# Patient Record
Sex: MALE | Race: BLACK OR AFRICAN AMERICAN | NOT HISPANIC OR LATINO | URBAN - METROPOLITAN AREA
[De-identification: names, ages, dates, MRNs, and addresses within clinical notes are randomized per-mention and may not be internally consistent; named-entity substitution may affect disease eponyms.]

---

## 2017-08-25 ENCOUNTER — INPATIENT (INPATIENT)
Facility: HOSPITAL | Age: 32
LOS: 0 days | Discharge: HOME | DRG: 394 | End: 2017-08-26
Attending: INTERNAL MEDICINE | Admitting: INTERNAL MEDICINE
Payer: MEDICAID

## 2017-08-25 VITALS
SYSTOLIC BLOOD PRESSURE: 119 MMHG | TEMPERATURE: 98 F | RESPIRATION RATE: 18 BRPM | HEART RATE: 90 BPM | OXYGEN SATURATION: 100 % | DIASTOLIC BLOOD PRESSURE: 76 MMHG

## 2017-08-25 PROCEDURE — 99285 EMERGENCY DEPT VISIT HI MDM: CPT | Mod: 25

## 2017-08-25 PROCEDURE — 71020: CPT | Mod: 26

## 2017-08-25 PROCEDURE — 93010 ELECTROCARDIOGRAM REPORT: CPT

## 2017-08-25 RX ORDER — SODIUM CHLORIDE 9 MG/ML
1000 INJECTION INTRAMUSCULAR; INTRAVENOUS; SUBCUTANEOUS
Qty: 0 | Refills: 0 | Status: DISCONTINUED | OUTPATIENT
Start: 2017-08-25 | End: 2017-08-26

## 2017-08-25 RX ORDER — GLUCAGON INJECTION, SOLUTION 0.5 MG/.1ML
1 INJECTION, SOLUTION SUBCUTANEOUS ONCE
Qty: 0 | Refills: 0 | Status: COMPLETED | OUTPATIENT
Start: 2017-08-25 | End: 2017-08-25

## 2017-08-25 RX ADMIN — Medication 1 MILLIGRAM(S): at 23:24

## 2017-08-25 RX ADMIN — GLUCAGON INJECTION, SOLUTION 1 MILLIGRAM(S): 0.5 INJECTION, SOLUTION SUBCUTANEOUS at 23:03

## 2017-08-25 RX ADMIN — SODIUM CHLORIDE 125 MILLILITER(S): 9 INJECTION INTRAMUSCULAR; INTRAVENOUS; SUBCUTANEOUS at 23:03

## 2017-08-25 NOTE — ED PROVIDER NOTE - ENMT, MLM
Airway patent, Nasal mucosa clear. Mouth with normal mucosa. Throat has no vesicles, no oropharyngeal exudates and uvula is midline. No pooling of secretions. No drooling. No stridor. No dysphonia. No FB visualized. Sip of water given to pt results in pt looking in discomfort when attempting to swallow. Pt able to swallow sip of water after approx 1 min with pain. No regurgitation or vomiting

## 2017-08-25 NOTE — H&P ADULT - PROBLEM SELECTOR PLAN 3
FENA: FENA: NS @ 80 cc/hr; replete electrolytes as needed;     Prophylaxis: IMPROVE score is zero- no need for VTE prophylaxis    DISPO: ADMIT TO F     FULL CODE

## 2017-08-25 NOTE — ED PROVIDER NOTE - PROGRESS NOTE DETAILS
D/w GI Fellow Epifanio martinez d/w GI attending - if pt is not pooling secretions and pt did tolerate some PO earlier, and ingestion 10 hours ago, unlikely food bolus requiring emergent esophogram / EGD. Trial of Glucagon / benzos. Keep NPO. Admit to medicine. They will see the pt in the morning. Pt is resting comfortably, protecting airway

## 2017-08-25 NOTE — H&P ADULT - NSHPPHYSICALEXAM_GEN_ALL_CORE
.  VITAL SIGNS:  T(C): 37 (08-25-17 @ 23:36), Max: 37 (08-25-17 @ 23:36)  T(F): 98.6 (08-25-17 @ 23:36), Max: 98.6 (08-25-17 @ 23:36)  HR: 70 (08-25-17 @ 23:36) (70 - 90)  BP: 104/63 (08-25-17 @ 23:36) (103/57 - 119/76)  BP(mean): --  RR: 16 (08-25-17 @ 23:36) (16 - 18)  SpO2: 99% (08-25-17 @ 23:36) (99% - 100%)  Wt(kg): --    PHYSICAL EXAM:    Constitutional: WDWN resting comfortably in bed; looks to be anxious and in distress   Head: NC/AT  Eyes: PERRL, EOMI, clear conjunctiva  ENT: no nasal discharge; uvula midline, no oropharyngeal erythema or exudates; MMM  Neck: supple; no JVD or thyromegaly  Respiratory: CTA B/L; no W/R/R, no retractions  Cardiac: +S1/S2; RRR; no M/R/G; PMI non-displaced  Gastrointestinal: soft, NT/ND; no rebound or guarding; +BSx4  Genitourinary: normal external genitalia  Back: spine midline, no bony tenderness or step-offs; no CVAT B/L  Extremities: WWP, no clubbing or cyanosis; no peripheral edema  Musculoskeletal: NROM x4; no joint swelling, tenderness or erythema  Vascular: 2+ radial, femoral, DP/PT pulses B/L  Dermatologic: skin warm, dry and intact; no rashes, wounds, or scars  Lymphatic: no submandibular or cervical LAD  Neurologic: AAOx3; CNII-XII grossly intact; no focal deficits  Psychiatric: affect and characteristics of appearance, verbalizations, behaviors are appropriate .  VITAL SIGNS:  T(C): 37 (08-25-17 @ 23:36), Max: 37 (08-25-17 @ 23:36)  T(F): 98.6 (08-25-17 @ 23:36), Max: 98.6 (08-25-17 @ 23:36)  HR: 70 (08-25-17 @ 23:36) (70 - 90)  BP: 104/63 (08-25-17 @ 23:36) (103/57 - 119/76)  BP(mean): --  RR: 16 (08-25-17 @ 23:36) (16 - 18)  SpO2: 99% (08-25-17 @ 23:36) (99% - 100%)  Wt(kg): --    PHYSICAL EXAM:    Constitutional: WDWN resting comfortably in bed; looks to be anxious and in distress   Head: NC/AT  Eyes: PERRL, EOMI, clear conjunctiva  ENT: no nasal discharge; uvula midline, no oropharyngeal erythema or exudates; MMM  Neck: supple; no JVD or thyromegaly  Respiratory: CTA B/L; no W/R/R, no retractions  Cardiac: +S1/S2; RRR; no M/R/G;  Gastrointestinal: soft, NT/ND; no rebound or guarding; +BSx4  Extremities: WWP, no clubbing or cyanosis; no peripheral edema  Musculoskeletal: NROM x4; no joint swelling, tenderness or erythema  Vascular: 2+ radial pulses B/L  Dermatologic: skin warm, dry and intact; no rashes, wounds, or scars  Lymphatic: no submandibular or cervical LAD  Neurologic: AAOx3; CNII-XII grossly intact; no focal deficits  Psychiatric: affect and characteristics of appearance, verbalizations, behaviors are appropriate

## 2017-08-25 NOTE — ED ADULT TRIAGE NOTE - CHIEF COMPLAINT QUOTE
pt c/o right side chest pain that started 4 hrs ago that radiates to his upper back , pt denies any SOB

## 2017-08-25 NOTE — H&P ADULT - PROBLEM SELECTOR PLAN 1
Patient with sudden onset chest discomfort s/p having a meal at 1 pm; exacerbated with sudden movements; patient currently able to handle his secretions s/p ativan and glucagon given; Patient with partial food obstruction  -- f/u GI reccs  -- start protonix 40 mg BID  -- c/w NS at 80 cc/hr  -- keep NPO till the am for possible endoscopy  -- glucagon PRN

## 2017-08-25 NOTE — H&P ADULT - PROBLEM SELECTOR PLAN 2
Patient with WBC of 11.6 with pre-neutrophil predominance; most likely reactiv ein setting of stress  -- will continue to monitor Patient with WBC of 11.6 with pre-neutrophil predominance; most likely reactive in setting of stress  -- will continue to monitor

## 2017-08-25 NOTE — ED ADULT NURSE NOTE - OBJECTIVE STATEMENT
31 y/o male with no significant medical hx arrived to [facility] ER reporting chest pain radiating to the back since 1pm today. Upon assessment, abdomen soft, lung fields WNL, breathing is equal and unlabored, pulses palpable, no visible injuries observed. Pt denies blurred vision, slurred speech, nausea, vomiting, diarrhea, fever, chills, LOC, SOB, weakness, fatigue, dizziness, headache, palpitations, recent travel, and recent injury. Care in progress. Awaiting disposition.

## 2017-08-25 NOTE — H&P ADULT - ASSESSMENT
Patient is a 33 yo male with no PMHX who presents with acute sudden chest discomfort. Patient is a 31 yo male with no PMHX who presents with acute sudden chest discomfort after eating a meal. suspicion for partial food impaction Patient is a 31 yo male with no PMHX who presents with acute sudden chest discomfort after eating a meal. chest discomfort likely caused by partial food impaction

## 2017-08-25 NOTE — H&P ADULT - HISTORY OF PRESENT ILLNESS
Patient is a 31 yo male with no PMHX who presents with acute diffculty swallowing Patient is a 33 yo male with no PMHX who presents with acute sudden chest discomfort. Pt reports at approx 1 pm he ate rice w/ sauce and meat. While eating he felt a discomfort in the substernal region that radiates to his belly and was having progressive difficulty swallowing. Patient has never felt this discomfort and has never had this happen to him before. Reports pain is worse when he moves around and is improved when he sits very still.  Pt attempted to induce vomiting with return of water and some rice, but no meat. Pt did not have relief of sx w/ induction of vomiting. When the sx did not resolve, he went to the [facility] ([facility]). Pt was given Maalox at [facility]. Pt reports he was able to tolerate the Maalox and felt a little better, and was discharged. Pt was advised to w/ a specialist for worsening of symptoms. Pt attempted to eat cereal resulting in spontaneous vomiting and spitting up. Patient at the time felt he wasn't having pain when swallowing his secretions so he came to the [facility] ED.     Vitals in the ED were T(F): Max: 98.6 HR:70 - 90 /57 - 119/76 RR: 16 - 18 SpO2: 99% - 100%. CXR revealed no abnormalities. Patient received glucagon and lorazepam in the ED,. Patient is a 31 yo male with no PMHX who presents with acute sudden chest discomfort. Pt reports at approx 1 pm he ate rice w/ sauce and meat. While eating he felt a discomfort in the substernal region that radiates to his belly and was having progressive difficulty swallowing. Patient has never felt this discomfort and has never had this happen to him before. Reports pain is worse when he moves around and is improved when he sits very still.  Pt attempted to induce vomiting with return of water and some rice, but no meat. Pt did not have relief of sx w/ induction of vomiting. When the sx did not resolve, he went to [facility] ([facility]). Pt was given Maalox at [facility]. Pt reports he was able to tolerate the Maalox and felt a little better, and was discharged. Pt was advised to w/ a specialist for worsening of symptoms. Pt attempted to eat cereal resulting in spontaneous vomiting and spitting up. Patient at the time felt he wasn't having pain when swallowing his secretions so he came to the [facility] ED.     Vitals in the ED were T(F): Max: 98.6 HR:70 - 90 /57 - 119/76 RR: 16 - 18 SpO2: 99% - 100%. CXR revealed no abnormalities. Patient received glucagon and lorazepam in the ED,.

## 2017-08-25 NOTE — ED PROVIDER NOTE - DIAGNOSTIC INTERPRETATION
ER Physician: Annalee Guerrero, DO  CHEST XRAY INTERPRETATION: lungs clear, heart shadow normal, bony structures intact

## 2017-08-25 NOTE — ED PROVIDER NOTE - OBJECTIVE STATEMENT
Pt with no sig PMHx, PSHx appendectomy p/w chest discomfort. Pt reports at approx 1 pm he ate rice w/ sauce and meat. While eating he felt a discomfort in the substernal region and felt he could not swallow his food. Pt attempted to induce vomiting with return of water and some rice, but no meat. Pt did not have relief of sx w/ induction of vomiting. When the sx did not resolve, he went to the hospital ([facility]). Pt was given Maalox at [facility]. Pt reports he was able to tolerate the Maalox and felt a little better, and was discharged. Pt was advised to return for repeat / worsening Hx obtained via [employer]  # [id number]. Pt with no sig PMHx, PSHx appendectomy p/w chest discomfort. Pt reports at approx 1 pm he ate rice w/ sauce and meat. While eating he felt a discomfort in the substernal region and felt he could not swallow his food. Pt attempted to induce vomiting with return of water and some rice, but no meat. Pt did not have relief of sx w/ induction of vomiting. When the sx did not resolve, he went to the hospital ([facility]). Pt was given Maalox at [facility]. Pt reports he was able to tolerate the Maalox and felt a little better, and was discharged. Pt was advised to return for repeat / worsening sx, or to f/u w/ a specialist. Pt attempted to drink water and eat cornflakes w/ milk, resulting in spontaneous vomiting and spitting up. Pt felt worsening pain, so came to the ED for further eval. No prior hx. Pt reports for years he has felt full early when eating. Pt denies any sig weight loss. When asked about HIV testing, pt reports he recently saw his primary doctor who performed a lot of tests, and said everything was fine. No abdominal pain. Pt reports tactile fever. Pt reports associated pain in the back. Denies substance abuse, alcohol use.

## 2017-08-25 NOTE — ED PROVIDER NOTE - MEDICAL DECISION MAKING DETAILS
Pt p/w substernal discomfort feeling like something is stuck in his esophagus s/p eating, with minimal tolerating of PO intake. No drooling / stridor. Protecting airway. Failed conservative treatment. Trial of Glucagon. Keep NPO, IVF. D/w GI - pt will likely need EGD / esophogram for possible food impaction / bolus. Also consider esophageal spasm, globus, mass/ stricture, GERD, other pathology. Low suspicion cardiac etiology based on H&P. Symmetric BPs.

## 2017-08-25 NOTE — H&P ADULT - ATTENDING COMMENTS
Pt seen and examined, VSS, exam RRR, CTAB, no tenderness on abd or chest.  33 yo M with chicken bone in proximal esophagus with minimal laceration to mucosa.  D/c home with clears for 24 hrs and PPI x 2 weeks.

## 2017-08-26 VITALS
SYSTOLIC BLOOD PRESSURE: 108 MMHG | TEMPERATURE: 99 F | DIASTOLIC BLOOD PRESSURE: 76 MMHG | RESPIRATION RATE: 18 BRPM | HEART RATE: 80 BPM | OXYGEN SATURATION: 99 %

## 2017-08-26 DIAGNOSIS — T18.128D FOOD IN ESOPHAGUS CAUSING OTHER INJURY, SUBSEQUENT ENCOUNTER: ICD-10-CM

## 2017-08-26 DIAGNOSIS — Z90.49 ACQUIRED ABSENCE OF OTHER SPECIFIED PARTS OF DIGESTIVE TRACT: Chronic | ICD-10-CM

## 2017-08-26 DIAGNOSIS — Z29.9 ENCOUNTER FOR PROPHYLACTIC MEASURES, UNSPECIFIED: ICD-10-CM

## 2017-08-26 DIAGNOSIS — D72.829 ELEVATED WHITE BLOOD CELL COUNT, UNSPECIFIED: ICD-10-CM

## 2017-08-26 LAB
ANION GAP SERPL CALC-SCNC: 14 MMOL/L — SIGNIFICANT CHANGE UP (ref 5–17)
BLD GP AB SCN SERPL QL: NEGATIVE — SIGNIFICANT CHANGE UP
BUN SERPL-MCNC: 13 MG/DL — SIGNIFICANT CHANGE UP (ref 7–23)
CALCIUM SERPL-MCNC: 9.5 MG/DL — SIGNIFICANT CHANGE UP (ref 8.4–10.5)
CHLORIDE SERPL-SCNC: 101 MMOL/L — SIGNIFICANT CHANGE UP (ref 96–108)
CO2 SERPL-SCNC: 24 MMOL/L — SIGNIFICANT CHANGE UP (ref 22–31)
CREAT SERPL-MCNC: 0.9 MG/DL — SIGNIFICANT CHANGE UP (ref 0.5–1.3)
GLUCOSE SERPL-MCNC: 87 MG/DL — SIGNIFICANT CHANGE UP (ref 70–99)
HCT VFR BLD CALC: 42.1 % — SIGNIFICANT CHANGE UP (ref 39–50)
HGB BLD-MCNC: 14.2 G/DL — SIGNIFICANT CHANGE UP (ref 13–17)
MAGNESIUM SERPL-MCNC: 2.3 MG/DL — SIGNIFICANT CHANGE UP (ref 1.6–2.6)
MCHC RBC-ENTMCNC: 29.2 PG — SIGNIFICANT CHANGE UP (ref 27–34)
MCHC RBC-ENTMCNC: 33.7 G/DL — SIGNIFICANT CHANGE UP (ref 32–36)
MCV RBC AUTO: 86.4 FL — SIGNIFICANT CHANGE UP (ref 80–100)
PHOSPHATE SERPL-MCNC: 3.9 MG/DL — SIGNIFICANT CHANGE UP (ref 2.5–4.5)
PLATELET # BLD AUTO: 218 K/UL — SIGNIFICANT CHANGE UP (ref 150–400)
POTASSIUM SERPL-MCNC: 3.8 MMOL/L — SIGNIFICANT CHANGE UP (ref 3.5–5.3)
POTASSIUM SERPL-SCNC: 3.8 MMOL/L — SIGNIFICANT CHANGE UP (ref 3.5–5.3)
RBC # BLD: 4.87 M/UL — SIGNIFICANT CHANGE UP (ref 4.2–5.8)
RBC # FLD: 12.7 % — SIGNIFICANT CHANGE UP (ref 10.3–16.9)
RH IG SCN BLD-IMP: POSITIVE — SIGNIFICANT CHANGE UP
SODIUM SERPL-SCNC: 139 MMOL/L — SIGNIFICANT CHANGE UP (ref 135–145)
WBC # BLD: 9.3 K/UL — SIGNIFICANT CHANGE UP (ref 3.8–10.5)
WBC # FLD AUTO: 9.3 K/UL — SIGNIFICANT CHANGE UP (ref 3.8–10.5)

## 2017-08-26 PROCEDURE — 99221 1ST HOSP IP/OBS SF/LOW 40: CPT | Mod: 25,GC

## 2017-08-26 PROCEDURE — 99222 1ST HOSP IP/OBS MODERATE 55: CPT

## 2017-08-26 PROCEDURE — 43247 EGD REMOVE FOREIGN BODY: CPT | Mod: GC

## 2017-08-26 RX ORDER — PANTOPRAZOLE SODIUM 20 MG/1
40 TABLET, DELAYED RELEASE ORAL EVERY 12 HOURS
Qty: 0 | Refills: 0 | Status: DISCONTINUED | OUTPATIENT
Start: 2017-08-26 | End: 2017-08-26

## 2017-08-26 RX ORDER — OMEPRAZOLE 10 MG/1
1 CAPSULE, DELAYED RELEASE ORAL
Qty: 14 | Refills: 0 | OUTPATIENT
Start: 2017-08-26 | End: 2017-09-09

## 2017-08-26 RX ADMIN — SODIUM CHLORIDE 80 MILLILITER(S): 9 INJECTION INTRAMUSCULAR; INTRAVENOUS; SUBCUTANEOUS at 00:38

## 2017-08-26 RX ADMIN — PANTOPRAZOLE SODIUM 40 MILLIGRAM(S): 20 TABLET, DELAYED RELEASE ORAL at 00:38

## 2017-08-26 NOTE — DISCHARGE NOTE ADULT - CARE PLAN
Principal Discharge DX:	Food impaction of esophagus, subsequent encounter  Goal:	No recurrence of impaction  Instructions for follow-up, activity and diet:	you were admitted given the concern for a piece of food stuck in your esophagus. You were evaluated by the gastroenterology team who did a procedure called an EGD, they removed a small bone stuck in your esophagus. For the next 24 hours limit your self to a clear liquid diet which include water, juices without pulps, gelatin containing foods like jello. Afterwards you may advance your diet. In addition, you will be prescribed a medication called omeprazole; take 1 40mg tablet daily for the next two weeks; it has been sent to [facility] , your pharmacy. You should follow up with your primary care physician once discharged. Please monitor your food intake of meats/fish, particuarly small pieces with bones inside; doing so may prevent this from happening again. Principal Discharge DX:	Food impaction of esophagus, subsequent encounter  Goal:	No recurrence of impaction  Instructions for follow-up, activity and diet:	you were admitted given the concern for a piece of food stuck in your esophagus. You were evaluated by the gastroenterology team who did a procedure called an EGD, they removed a small bone stuck in your esophagus. For the next 24 hours limit your self to a clear liquid diet which include water, juices without pulps, gelatin containing foods like jello. Afterwards you may advance your diet. In addition, you will be prescribed a medication called omeprazole; take 1 40mg tablet daily for the next two weeks; it has been sent to [facility] , your pharmacy. You should follow up with your primary care physician once discharged. Please monitor your food intake of meats/fish, particuarly small pieces with bones inside; doing so may prevent this from happening again.

## 2017-08-26 NOTE — CONSULT NOTE ADULT - SUBJECTIVE AND OBJECTIVE BOX
HPI:    31 yo male with no PMHX who presents with acute sudden chest discomfort. Pt reports at approx 1 pm he ate rice w/ sauce and meat. While eating he felt a discomfort in the substernal region that radiates to his belly and was having progressive difficulty swallowing. Patient has never felt this discomfort and has never had this happen to him before. Reports pain is worse when he moves around and is improved when he sits very still.  Pt attempted to induce vomiting with return of water and some rice, but no meat. Pt did not have relief of sx w/ induction of vomiting. When the sx did not resolve, he went to [facility] ([facility]). Pt was given Maalox at [facility]. Pt reports he was able to tolerate the Maalox and felt a little better, and was discharged. Pt was advised to w/ a specialist for worsening of symptoms. Pt attempted to eat cereal resulting in spontaneous vomiting and spitting up.         PAST MEDICAL & SURGICAL HISTORY:  No pertinent past medical history  History of appendectomy  	    MEDICATIONS  (STANDING):  sodium chloride 0.9%. 1000 milliLiter(s) (80 mL/Hr) IV Continuous <Continuous>  pantoprazole  Injectable 40 milliGRAM(s) IV Push every 12 hours    MEDICATIONS  (PRN):      Allergies    No Known Allergies    Intolerances        SOCIAL HISTORY:    FAMILY HISTORY:  No pertinent family history in first degree relatives      Vital Signs Last 24 Hrs  T(C): 36.8 (26 Aug 2017 06:19), Max: 37 (25 Aug 2017 23:36)  T(F): 98.3 (26 Aug 2017 06:19), Max: 98.6 (25 Aug 2017 23:36)  HR: 83 (26 Aug 2017 06:19) (70 - 90)  BP: 111/75 (26 Aug 2017 06:19) (103/57 - 119/76)  BP(mean): --  RR: 19 (26 Aug 2017 06:19) (16 - 19)  SpO2: 100% (26 Aug 2017 06:19) (99% - 100%)    PHYSICAL EXAM:    GEN: well appearing, NAD, AOx3, tolerating secretions  HEENT: anicteric  CHEST: equal chest rise b/l  CVS: no m/r/g  ABD: soft, tn, nd bs+      LABS:                        14.2   9.3   )-----------( 218      ( 26 Aug 2017 06:26 )             42.1     08-26    139  |  101  |  13  ----------------------------<  87  3.8   |  24  |  0.90    Ca    9.5      26 Aug 2017 06:26  Phos  3.9     08-26  Mg     2.3     08-26    TPro  8.1  /  Alb  4.5  /  TBili  0.6  /  DBili  x   /  AST  26  /  ALT  16  /  AlkPhos  58  08-25          RADIOLOGY & ADDITIONAL STUDIES:

## 2017-08-26 NOTE — DISCHARGE NOTE ADULT - PATIENT PORTAL LINK FT
“You can access the Follow[facility] Patient Portal, offered by [facility], by registering with the following website: [email or web address]”

## 2017-08-26 NOTE — DISCHARGE NOTE ADULT - CARE PROVIDER_API CALL
Prasad Cedillo), Gastroenterology; Internal Medicine  [address]  [address] [place] [address]  Phone: [phone]  Fax: [phone]

## 2017-08-26 NOTE — DISCHARGE NOTE ADULT - HOSPITAL COURSE
31 yo male with no PMHX who presents with acute sudden chest discomfort, evaluated by GI and foudn to have food bolus/bone impacted in the esophagus; it was removed via EGD without complications. Patient noted to have improvement of symptoms. Will be discharged home with clear liquid diet for 24 hours, and PPI once daily for 2 weeks. He will follow up with his primary care physician. He was counseled to monitor his dietary habits concerning eating meats/fish with small bones. Patient acknowledged  and is medically stable for discharge home.

## 2017-08-26 NOTE — DISCHARGE NOTE ADULT - MEDICATION SUMMARY - MEDICATIONS TO TAKE
I will START or STAY ON the medications listed below when I get home from the hospital:    omeprazole 40 mg oral delayed release capsule  -- 1 cap(s) by mouth once a day (at bedtime)  -- It is very important that you take or use this exactly as directed.  Do not skip doses or discontinue unless directed by your doctor.  Obtain medical advice before taking any non-prescription drugs as some may affect the action of this medication.  Swallow whole.  Do not crush.    -- Indication: For GI PPX s/p food bone removal via EGD

## 2017-08-26 NOTE — CONSULT NOTE ADULT - ASSESSMENT
32 year old male with odynophagia with swallowing after eating meat and rice. Concern for food impaction vs esophagitis. Plan for endoscopic evaluation today.    -NPO  -40 mg PPI IV  -Plan for EGD today

## 2017-08-26 NOTE — DISCHARGE NOTE ADULT - PLAN OF CARE
No recurrence of impaction you were admitted given the concern for a piece of food stuck in your esophagus. You were evaluated by the gastroenterology team who did a procedure called an EGD, they removed a small bone stuck in your esophagus. For the next 24 hours limit your self to a clear liquid diet which include water, juices without pulps, gelatin containing foods like jello. Afterwards you may advance your diet. In addition, you will be prescribed a medication called omeprazole; take 1 40mg tablet daily for the next two weeks; it has been sent to [facility] , your pharmacy. You should follow up with your primary care physician once discharged. Please monitor your food intake of meats/fish, particuarly small pieces with bones inside; doing so may prevent this from happening again.

## 2017-08-26 NOTE — DISCHARGE NOTE ADULT - CARE PROVIDERS DIRECT ADDRESSES
[email or web address]

## 2017-08-28 LAB — SURGICAL PATHOLOGY STUDY: SIGNIFICANT CHANGE UP

## 2017-08-28 PROCEDURE — 99285 EMERGENCY DEPT VISIT HI MDM: CPT | Mod: 25

## 2017-08-28 PROCEDURE — 85025 COMPLETE CBC W/AUTO DIFF WBC: CPT

## 2017-08-28 PROCEDURE — 86850 RBC ANTIBODY SCREEN: CPT

## 2017-08-28 PROCEDURE — 83690 ASSAY OF LIPASE: CPT

## 2017-08-28 PROCEDURE — 36415 COLL VENOUS BLD VENIPUNCTURE: CPT

## 2017-08-28 PROCEDURE — 86901 BLOOD TYPING SEROLOGIC RH(D): CPT

## 2017-08-28 PROCEDURE — 88300 SURGICAL PATH GROSS: CPT

## 2017-08-28 PROCEDURE — 80048 BASIC METABOLIC PNL TOTAL CA: CPT

## 2017-08-28 PROCEDURE — 83735 ASSAY OF MAGNESIUM: CPT

## 2017-08-28 PROCEDURE — 80053 COMPREHEN METABOLIC PANEL: CPT

## 2017-08-28 PROCEDURE — 86900 BLOOD TYPING SEROLOGIC ABO: CPT

## 2017-08-28 PROCEDURE — 71046 X-RAY EXAM CHEST 2 VIEWS: CPT

## 2017-08-28 PROCEDURE — 93005 ELECTROCARDIOGRAM TRACING: CPT

## 2017-08-28 PROCEDURE — 84100 ASSAY OF PHOSPHORUS: CPT

## 2017-08-28 PROCEDURE — 96375 TX/PRO/DX INJ NEW DRUG ADDON: CPT

## 2017-08-28 PROCEDURE — 85027 COMPLETE CBC AUTOMATED: CPT

## 2017-08-28 PROCEDURE — 96374 THER/PROPH/DIAG INJ IV PUSH: CPT
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
## 2017-08-30 DIAGNOSIS — T18.128A FOOD IN ESOPHAGUS CAUSING OTHER INJURY, INITIAL ENCOUNTER: ICD-10-CM

## 2017-08-30 DIAGNOSIS — S11.22XA: ICD-10-CM
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
## 2017-08-30 DIAGNOSIS — D72.829 ELEVATED WHITE BLOOD CELL COUNT, UNSPECIFIED: ICD-10-CM

## 2017-08-30 DIAGNOSIS — Z90.49 ACQUIRED ABSENCE OF OTHER SPECIFIED PARTS OF DIGESTIVE TRACT: ICD-10-CM

## 2017-08-30 DIAGNOSIS — R07.9 CHEST PAIN, UNSPECIFIED: ICD-10-CM

## 2020-08-07 NOTE — PRE-OP CHECKLIST - HEIGHT IN CM
Unable to give result  For covid 19 out due to pending test gave information on where to find results in the future           167.64

## 2022-06-07 NOTE — PATIENT PROFILE ADULT. - BRADEN SCORE (IF 18 OR LESS ACTIVATE SKIN INJURY RISK INCREASED GUIDELINE), MLM
Final Anesthesia Post-op Assessment    Patient: Molina Petersen  Procedure(s) Performed: EGD (ESOPHAGOGASTRODUODENOSCOPY) WITH BXS AND CLIP PLACED  Anesthesia type: MAC    Vitals Value Taken Time   Temp 36.3 Â°C (97.4 Â°F) 06/07/22 1058   Pulse 96 06/07/22 1058   Resp 20 06/07/22 1058   SpO2 96 % 06/07/22 1058   BP 93/53 06/07/22 1058         Patient Location: Phase II  Post-op Vital Signs:stable  Level of Consciousness: awake, alert, follows commands and participates in exam  Respiratory Status: spontaneous ventilation and room air  Cardiovascular stable  Hydration: euvolemic  Pain Management: adequately controlled  Handoff: Handoff to receiving clinician was performed and questions were answered  Vomiting: none  Nausea: None  Airway Patency:patent  Post-op Assessment: awake, alert, appropriately conversant, or baseline, no complications and patient tolerated procedure well with no complications      There were no known complications for this encounter.
21